# Patient Record
Sex: FEMALE | Race: WHITE | Employment: OTHER | URBAN - METROPOLITAN AREA
[De-identification: names, ages, dates, MRNs, and addresses within clinical notes are randomized per-mention and may not be internally consistent; named-entity substitution may affect disease eponyms.]

---

## 2018-02-26 ENCOUNTER — OFFICE VISIT (OUTPATIENT)
Dept: URGENT CARE | Facility: RETAIL CLINIC | Age: 68
End: 2018-02-26
Payer: COMMERCIAL

## 2018-02-26 ENCOUNTER — OFFICE VISIT (OUTPATIENT)
Dept: INTERNAL MEDICINE | Facility: CLINIC | Age: 68
End: 2018-02-26
Payer: COMMERCIAL

## 2018-02-26 VITALS
HEART RATE: 85 BPM | OXYGEN SATURATION: 96 % | SYSTOLIC BLOOD PRESSURE: 145 MMHG | DIASTOLIC BLOOD PRESSURE: 74 MMHG | TEMPERATURE: 97.4 F

## 2018-02-26 VITALS
SYSTOLIC BLOOD PRESSURE: 152 MMHG | HEART RATE: 98 BPM | HEIGHT: 64 IN | BODY MASS INDEX: 25.25 KG/M2 | DIASTOLIC BLOOD PRESSURE: 74 MMHG | OXYGEN SATURATION: 98 % | TEMPERATURE: 98.9 F | WEIGHT: 147.9 LBS

## 2018-02-26 DIAGNOSIS — R05.9 COUGH: ICD-10-CM

## 2018-02-26 DIAGNOSIS — J20.9 ACUTE BRONCHITIS, UNSPECIFIED ORGANISM: Primary | ICD-10-CM

## 2018-02-26 DIAGNOSIS — R06.02 SHORTNESS OF BREATH: Primary | ICD-10-CM

## 2018-02-26 PROCEDURE — 99213 OFFICE O/P EST LOW 20 MIN: CPT | Performed by: INTERNAL MEDICINE

## 2018-02-26 RX ORDER — VITAMIN E 268 MG
400 CAPSULE ORAL
COMMUNITY

## 2018-02-26 RX ORDER — ACETAMINOPHEN 160 MG
2000 TABLET,DISINTEGRATING ORAL
COMMUNITY

## 2018-02-26 RX ORDER — AZITHROMYCIN 250 MG/1
TABLET, FILM COATED ORAL
Qty: 6 TABLET | Refills: 0 | Status: SHIPPED | OUTPATIENT
Start: 2018-02-26

## 2018-02-26 ASSESSMENT — PAIN SCALES - GENERAL: PAINLEVEL: NO PAIN (0)

## 2018-02-26 NOTE — NURSING NOTE
"Chief Complaint   Patient presents with     URI     chest congestion, cough x 1 week, wheezing with exhale.       Initial /74  Pulse 98  Temp 98.9  F (37.2  C) (Temporal)  Ht 5' 4\" (1.626 m)  Wt 147 lb 14.4 oz (67.1 kg)  SpO2 98%  BMI 25.39 kg/m2 Estimated body mass index is 25.39 kg/(m^2) as calculated from the following:    Height as of this encounter: 5' 4\" (1.626 m).    Weight as of this encounter: 147 lb 14.4 oz (67.1 kg).  Medication Reconciliation: complete   Christi Wooten CMA    "

## 2018-02-26 NOTE — PROGRESS NOTES
A pleasant 67 year old  female with symptoms of wheezing,cough and increasing dyspnea and feeling unwell since 2 days ago is advised to see her PCP with whom she has an appointment this afternoon. Given the proximity of time and place this is a reasonable plan given the limited resources of [facility].

## 2018-02-26 NOTE — MR AVS SNAPSHOT
"              After Visit Summary   2018    Nohemy Toledo    MRN: [id number]           Patient Information     Date Of Birth          1950        Visit Information        Provider Department      2018 10:50 AM Rosalina Kim MD [facility]        Today's Diagnoses     Shortness of breath    -  1       Follow-ups after your visit        Who to contact     You can reach your care team any time of the day by calling [phone].  Notification of test results:  If you have an abnormal lab result, we will notify you by phone as soon as possible.         Additional Information About Your Visit        MyChart Information     [employer] lets you send messages to your doctor, view your test results, renew your prescriptions, schedule appointments and more. To sign up, go to [email or web address] . Click on \"Log in\" on the left side of the screen, which will take you to the Welcome page. Then click on \"Sign up Now\" on the right side of the page.     You will be asked to enter the access code listed below, as well as some personal information. Please follow the directions to create your username and password.     Your access code is: [email or web address]  Expires: 2018  3:49 PM     Your access code will  in 90 days. If you need help or a new code, please call your [facility] clinic or [phone].        Care EveryWhere ID     This is your Care EveryWhere ID. This could be used by other organizations to access your [facility] medical records  [id number]        Your Vitals Were     Pulse Temperature Pulse Oximetry             85 97.4  F (36.3  C) (Oral) 96%          Blood Pressure from Last 3 Encounters:   18 152/74   18 145/74   16 128/64    Weight from Last 3 Encounters:   18 147 lb 14.4 oz (67.1 kg)   16 139 lb 8 oz (63.3 kg)   03 143 lb 8 oz (65.1 kg)              Today, you had the following     No orders found for display         Today's " Medication Changes          These changes are accurate as of 2/26/18  5:00 PM.  If you have any questions, ask your nurse or doctor.               Start taking these medicines.        Dose/Directions    azithromycin 250 MG tablet   Commonly known as:  ZITHROMAX   Used for:  Acute bronchitis, unspecified organism   Started by:  Eitan Sheffiled MD        Two tablets first day, then one tablet daily for four days.   Quantity:  6 tablet   Refills:  0            Where to get your medicines      These medications were sent to [address] - [address]  [address], [facility] [address]     Phone:  [phone]     azithromycin 250 MG tablet                Primary Care Provider Office Phone # Fax #    Janet Owusu PA-C [phone]       [address] [place] [address]        Equal Access to Services     [facility] TERESA : Hadii deysi agustin hadasho Soomaali, waaxda luqadaha, qaybta kaalmada adeegyada, mansi minaya . So [facility] [phone].    ATENCIÓN: Si habla español, tiene a valle disposición servicios gratuitos de asistencia lingüística. Llame al [phone].    We comply with applicable federal civil rights laws and [place] laws. We do not discriminate on the basis of race, color, national origin, age, disability, sex, sexual orientation, or gender identity.            Thank you!     Thank you for choosing [facility]  for your care. Our goal is always to provide you with excellent care. Hearing back from our patients is one way we can continue to improve our services. Please take a few minutes to complete the written survey that you may receive in the mail after your visit with us. Thank you!             Your Updated Medication List - Protect others around you: Learn how to safely use, store and throw away your medicines at www.disposemymeds.org.          This list is accurate as of 2/26/18  5:00 PM.  Always use your most recent med  list.                   Brand Name Dispense Instructions for use Diagnosis    azithromycin 250 MG tablet    ZITHROMAX    6 tablet    Two tablets first day, then one tablet daily for four days.    Acute bronchitis, unspecified organism       ADDY C Tabs     90         Kelp Tabs           Magnesium  (64 MG) MG Tbcr CR tablet   Generic drug:  magnesium chloride           * NEW MED      DIM Complex        * NEW MED      Inform Metabolic age support Probiotic once daily        * NEW MED      Sauerkraut 2 Tablespoons daily        * NEW MED      Indole 3 Carbinol takes 2 @ breakfast        * NEW MED      E-Tea 2 twice daily on empty stomach.        * NEW MED      EGCg  Green tea extract takes 2 twice daily        OMEGA-3 CAPS   OR           Silymarin 50 MG Caps           STATIN NOT PRESCRIBED (INTENTIONAL)     0 each    Statin not prescribed intentionally due to Other: patient refusal.    Hyperlipidemia with target LDL less than 100       UBIQUINOL PO      Take 200 mg by mouth        vitamin D3 2000 UNITS Caps      Take 2,000 Units by mouth        * vitamin E 400 UNIT capsule      Take 400 Units by mouth        * vitamin E 400 UNIT capsule   Generic drug:  vitamin E           WOMENS DAILY MULTIVITAMIN Tabs     30         * Notice:  This list has 8 medication(s) that are the same as other medications prescribed for you. Read the directions carefully, and ask your doctor or other care provider to review them with you.

## 2018-02-26 NOTE — NURSING NOTE
"Chief Complaint   Patient presents with     Cough     x a week       Initial /74  Pulse 85  Temp 97.4  F (36.3  C) (Oral)  SpO2 96% Estimated body mass index is 23.95 kg/(m^2) as calculated from the following:    Height as of 9/6/16: 5' 4\" (1.626 m).    Weight as of 9/6/16: 139 lb 8 oz (63.3 kg).  Medication Reconciliation: complete     Marielena Fritz      "

## 2018-02-26 NOTE — PROGRESS NOTES
"Nohemy Toledo is a 67 year old female who presents with being sick, last week started feeling bad.  Cough and possible bronchitis, chest congestion.  No fevers, no body aches.  Exhales and has wheezing, sometimes can sleep ok.     Past Medical History:   Diagnosis Date     Calculus of gallbladder with acute cholecystitis, without mention of obstruction 10/02    gallbaladder cleanse through chiropractor     Calculus of kidney     passed stone 2/03-no medical visit     Carcinoma of left breast (H) 2014    bilateral mastectomy     History of hysterectomy for benign disease     ovaries remain     Other malaise and fatigue 1970    since hepatitis has experienced fatigue, better in the last 6 years since using herbal supplements     Unspecified viral hepatitis without mention of hepatic coma 1970     Current Outpatient Prescriptions   Medication     Cholecalciferol (VITAMIN D3) 2000 UNITS CAPS     NEW MED     NEW MED     NEW MED     NEW MED     NEW MED     NEW MED     UBIQUINOL PO     vitamin E 400 UNIT capsule     STATIN NOT PRESCRIBED, INTENTIONAL,     OMEGA-3 CAPS   OR     MAGNESIUM  MG OR TBCR     ADDY C OR TABS     E-400 400 IU OR CAPS     SILYMARIN 50 MG OR CAPS     WOMENS DAILY MULTIVITAMIN OR TABS     KELP OR TABS     No current facility-administered medications for this visit.      Social History   Substance Use Topics     Smoking status: Never Smoker     Smokeless tobacco: Not on file     Alcohol use No     Physical Exam  /74  Pulse 98  Temp 98.9  F (37.2  C) (Temporal)  Ht 5' 4\" (1.626 m)  Wt 147 lb 14.4 oz (67.1 kg)  SpO2 98%  BMI 25.39 kg/m2  General Appearance-healthy, alert, no distress  ENT-ENT exam normal, no neck nodes or sinus tenderness  Cardiac-regular rate and rhythm  with normal S1, S2 ; no murmur, rub or gallops  Lungs-mild to moderate expiratory rhonchi and mild to moderate expiratory wheezes    ASSESSMENT:  Patient here with cough and wheezing, no fevers. Will treat as " bronchitis with a zpak, wait on an xray today. Offered albuterol inhaler but she didn't want it yet. Can try otc mucinex and other cough remedies.  Follow up as needed.    BP is high, needs to follow this and treat if needed.    Electronically signed by Eitan Sheffield MD

## 2018-02-26 NOTE — MR AVS SNAPSHOT
"              After Visit Summary   2018    Nohemy Toledo    MRN: [id number]           Patient Information     Date Of Birth          1950        Visit Information        Provider Department      2018 3:30 PM Eitan Sheffield MD [facility]         Follow-ups after your visit        Who to contact     If you have questions or need follow up information about today's clinic visit or your schedule please contact [facility] directly at [phone].  Normal or non-critical lab and imaging results will be communicated to you by [employer], letter or phone within 4 business days after the clinic has received the results. If you do not hear from us within 7 days, please contact the clinic through [employer] or phone. If you have a critical or abnormal lab result, we will notify you by phone as soon as possible.  Submit refill requests through [employer] or call your pharmacy and they will forward the refill request to us. Please allow 3 business days for your refill to be completed.          Additional Information About Your Visit        [facility] Information     [employer] lets you send messages to your doctor, view your test results, renew your prescriptions, schedule appointments and more. To sign up, go to [email or web address] . Click on \"Log in\" on the left side of the screen, which will take you to the Welcome page. Then click on \"Sign up Now\" on the right side of the page.     You will be asked to enter the access code listed below, as well as some personal information. Please follow the directions to create your username and password.     Your access code is: [email or web address]  Expires: 2018  3:49 PM     Your access code will  in 90 days. If you need help or a new code, please call your [facility] or [phone].        Care EveryWhere ID     This is your Care EveryWhere ID. This could be used by other organizations to access your [facility] medical " "records  [id number]        Your Vitals Were     Pulse Temperature Height Pulse Oximetry BMI (Body Mass Index)       98 98.9  F (37.2  C) (Temporal) 5' 4\" (1.626 m) 98% 25.39 kg/m2        Blood Pressure from Last 3 Encounters:   02/26/18 152/74   02/26/18 145/74   09/06/16 128/64    Weight from Last 3 Encounters:   02/26/18 147 lb 14.4 oz (67.1 kg)   09/06/16 139 lb 8 oz (63.3 kg)   11/25/03 143 lb 8 oz (65.1 kg)              Today, you had the following     No orders found for display       Primary Care Provider Office Phone # Fax #    Janet Owusu PA-C [phone] [address] [place] [address]        Equal Access to Services     [facility]: Hadii deysi agustin hadasho Soomaali, waaxda luqadaha, qaybta kaalmada adeegyada, mansi michaud haytripp minaya . So [facility] [phone].    ATENCIÓN: Si habla español, tiene a valle disposición servicios gratuitos de asistencia lingüística. Llame al [phone].    We comply with applicable federal civil rights laws and [place] laws. We do not discriminate on the basis of race, color, national origin, age, disability, sex, sexual orientation, or gender identity.            Thank you!     Thank you for choosing [facility]  for your care. Our goal is always to provide you with excellent care. Hearing back from our patients is one way we can continue to improve our services. Please take a few minutes to complete the written survey that you may receive in the mail after your visit with us. Thank you!             Your Updated Medication List - Protect others around you: Learn how to safely use, store and throw away your medicines at www.disposemymeds.org.          This list is accurate as of 2/26/18  3:49 PM.  Always use your most recent med list.                   Brand Name Dispense Instructions for use Diagnosis    ADDY C Tabs     90         Kelp Tabs           Magnesium  (64 MG) MG Tbcr CR tablet   Generic drug:  " magnesium chloride           * NEW MED      DIM Complex        * NEW MED      Inform Metabolic age support Probiotic once daily        * NEW MED      Sauerkraut 2 Tablespoons daily        * NEW MED      Indole 3 Carbinol takes 2 @ breakfast        * NEW MED      E-Tea 2 twice daily on empty stomach.        * NEW MED      EGCg  Green tea extract takes 2 twice daily        OMEGA-3 CAPS   OR           Silymarin 50 MG Caps           STATIN NOT PRESCRIBED (INTENTIONAL)     0 each    Statin not prescribed intentionally due to Other: patient refusal.    Hyperlipidemia with target LDL less than 100       UBIQUINOL PO      Take 200 mg by mouth        vitamin D3 2000 UNITS Caps      Take 2,000 Units by mouth        * vitamin E 400 UNIT capsule      Take 400 Units by mouth        * vitamin E 400 UNIT capsule   Generic drug:  vitamin E           WOMENS DAILY MULTIVITAMIN Tabs     30         * Notice:  This list has 8 medication(s) that are the same as other medications prescribed for you. Read the directions carefully, and ask your doctor or other care provider to review them with you.

## 2019-01-11 ENCOUNTER — ALLIED HEALTH/NURSE VISIT (OUTPATIENT)
Dept: FAMILY MEDICINE | Facility: CLINIC | Age: 69
End: 2019-01-11
Payer: MEDICARE

## 2019-01-11 DIAGNOSIS — C50.919 BREAST CANCER (H): Primary | ICD-10-CM

## 2019-01-11 PROCEDURE — 93000 ELECTROCARDIOGRAM COMPLETE: CPT

## 2019-01-16 ENCOUNTER — TRANSFERRED RECORDS (OUTPATIENT)
Dept: HEALTH INFORMATION MANAGEMENT | Facility: CLINIC | Age: 69
End: 2019-01-16

## 2020-02-10 ENCOUNTER — HEALTH MAINTENANCE LETTER (OUTPATIENT)
Age: 70
End: 2020-02-10

## 2020-11-16 ENCOUNTER — HEALTH MAINTENANCE LETTER (OUTPATIENT)
Age: 70
End: 2020-11-16

## 2021-04-04 ENCOUNTER — HEALTH MAINTENANCE LETTER (OUTPATIENT)
Age: 71
End: 2021-04-04

## 2021-09-18 ENCOUNTER — HEALTH MAINTENANCE LETTER (OUTPATIENT)
Age: 71
End: 2021-09-18

## 2022-04-30 ENCOUNTER — HEALTH MAINTENANCE LETTER (OUTPATIENT)
Age: 72
End: 2022-04-30

## 2022-11-20 ENCOUNTER — HEALTH MAINTENANCE LETTER (OUTPATIENT)
Age: 72
End: 2022-11-20

## 2023-06-01 ENCOUNTER — HEALTH MAINTENANCE LETTER (OUTPATIENT)
Age: 73
End: 2023-06-01